# Patient Record
Sex: FEMALE | Race: WHITE | URBAN - NONMETROPOLITAN AREA
[De-identification: names, ages, dates, MRNs, and addresses within clinical notes are randomized per-mention and may not be internally consistent; named-entity substitution may affect disease eponyms.]

---

## 2018-04-04 ENCOUNTER — OFFICE VISIT (OUTPATIENT)
Dept: PODIATRY | Facility: OTHER | Age: 23
End: 2018-04-04
Payer: COMMERCIAL

## 2018-04-04 VITALS
BODY MASS INDEX: 26.63 KG/M2 | HEIGHT: 64 IN | TEMPERATURE: 97.4 F | DIASTOLIC BLOOD PRESSURE: 72 MMHG | SYSTOLIC BLOOD PRESSURE: 120 MMHG | WEIGHT: 156 LBS

## 2018-04-04 DIAGNOSIS — Q66.6 PES VALGUS: ICD-10-CM

## 2018-04-04 DIAGNOSIS — M77.9 CAPSULITIS: Primary | ICD-10-CM

## 2018-04-04 PROCEDURE — 99203 OFFICE O/P NEW LOW 30 MIN: CPT | Performed by: PODIATRIST

## 2018-04-04 ASSESSMENT — PAIN SCALES - GENERAL: PAINLEVEL: MODERATE PAIN (4)

## 2018-04-04 NOTE — LETTER
[facility]  [address] [facility] [address]  [phone]    2018      RE:  Sia Bello  : 1995      To whom it may concern:    This patient may return to work as tolerated in fracture boot for 2-3 weeks.     Sincerely,          Teofilo Burkett DPM

## 2018-04-04 NOTE — NURSING NOTE
Dispensed 1 Pneumatic Walking Brace, Size Small, with [facility] agreement signed by patient. Dipti Fonseca CMA, April 4, 2018

## 2018-04-04 NOTE — NURSING NOTE
"Chief Complaint   Patient presents with     Consult     dorsal Left foot pain,onset 4/1/2018; new pt to [facility]     other     27 weeks pregnant       Initial /72 (BP Location: Right arm, Cuff Size: Adult Regular)  Temp 97.4  F (36.3  C) (Temporal)  Ht 5' 4\" (1.626 m)  Wt 156 lb (70.8 kg)  BMI 26.78 kg/m2 Estimated body mass index is 26.78 kg/(m^2) as calculated from the following:    Height as of this encounter: 5' 4\" (1.626 m).    Weight as of this encounter: 156 lb (70.8 kg).  BP completed using cuff size: regular  Medication Reconciliation: complete    Dipti Fonseca CMA, April 4, 2018    "

## 2018-04-04 NOTE — PATIENT INSTRUCTIONS
Reliable shoe stores: To maximize your experience and provide the best possible fit.  Be sure to show them your foot concerns and tell them Dr. Burkett sent you.      Stores listed in bold have only athletic shoes, and stores that are not bold are mostly casual or variety of shoes    [employer]  [address] [place] [address]  [phone]    [employer] [place]  [address] [place] [address]  [phone]     [employer]  [address] [place] [address]  [phone]    [employer]  [address] [facility] [address]  [phone]    [employer]  [address] [place], [place] [address] [phone]    [employer]  [address] [place] [address]  [phone]                         [employer] Locations:     [address] [place] [address]   [phone]     [address] [place] [address] [address] [place] [address]   [phone]     [address] [place] [address]   [phone]     [address] [facility] [place] [address]   [phone]     [address] [place] [address]   [phone]     [address] [place] [address]   [phone]     [address] [facility] [address]   [place] [address]   [phone]     [address] [facility]. [address]   [place] [address]   [phone]             How to find reasonable shoes          The correct width    Correct Fitting    Correct Length      Foot Distortion    Posture Distortion                          Torsional Rigidity      Grasp behind the heel and underneath the foot and twist      Bad    Excessive torsion/twist in midfoot     Less torsion/twist in midfoot is better                   Heel Counter Rigidity      Grasp just above   midsole and squeeze      Bad    Soft heel counter      Good    Rigid Heel Counter      Flexion Rigidity      Grasp shoe and bend from forefoot to rearfoot

## 2018-04-04 NOTE — LETTER
"    4/4/2018         RE: Sia Bello  [address]  [facility] [address]        Dear Colleague,    Thank you for referring your patient, Sia Bello, to the [facility]. Please see a copy of my visit note below.    HPI:  Sia Bello is a 22 year old female who is seen in consultation at the request of self.    Pt presents for eval of:   (Onset, Location, L/R, Character, Treatments, Injury if yes)    27 weeks pregnant.     Onset 4/1/2018, dorsal Left foot pain that radiates. No injury noted.  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      Weight management plan: Patient was referred to their PCP to discuss a diet and exercise plan.     Patient to follow up with Primary Care provider regarding elevated blood pressure.    ROS:  10 point ROS neg other than the symptoms noted above in the HPI.    PAST MEDICAL HISTORY: History reviewed. No pertinent past medical history.     PAST SURGICAL HISTORY: History reviewed. No pertinent surgical history.     MEDICATIONS:   Current Outpatient Prescriptions:      Prenatal Multivit-Min-Fe-FA (PRENATAL VITAMINS PO), , Disp: , Rfl:      ALLERGIES:  No Known Allergies     SOCIAL HISTORY:   Social History     Social History     Marital status: Single     Spouse name: N/A     Number of children: N/A     Years of education: N/A     Occupational History     Not on file.     Social History Main Topics     Smoking status: Never Smoker     Smokeless tobacco: Never Used     Alcohol use Not on file     Drug use: Not on file     Sexual activity: Not on file     Other Topics Concern     Not on file     Social History Narrative     No narrative on file        FAMILY HISTORY: History reviewed. No pertinent family history.     EXAM:Vitals: /72 (BP Location: Right arm, Cuff Size: Adult Regular)  Temp 97.4  F (36.3  C) (Temporal)  Ht 5' 4\" (1.626 m)  Wt 156 lb (70.8 kg) "  BMI 26.78 kg/m2  BMI= Body mass index is 26.78 kg/(m^2).    General appearance: Patient is alert and fully cooperative with history & exam.  No sign of distress is noted during the visit.     Psychiatric: Affect is pleasant & appropriate.  Patient appears motivated to improve health.     Respiratory: Breathing is regular & unlabored while sitting.     HEENT: Hearing is intact to spoken word.  Speech is clear.  No gross evidence of visual impairment that would impact ambulation.     Vascular: DP & PT pulses are intact & regular bilaterally.  No significant edema or varicosities noted.  CFT and skin temperature is normal to both lower extremities.     Neurologic: Lower extremity sensation is intact to light touch.  No evidence of weakness or contracture in the lower extremities.  No evidence of neuropathy.    Dermatologic: Skin is intact to both lower extremities with adequate texture, turgor and tone about the integument.  No paronychia or evidence of soft tissue infection is noted.     Musculoskeletal: Patient is ambulatory without assistive device or brace.  I will forefoot valgus is noted clinically.  Most of her symptoms appear to be associated about the third and more so the fourth metatarsal shaft of the left foot.  Possible subtle induration is noted no crepitus and negative drawer maneuver of all the metatarsal phalangeal joints.  Subtle discomfort is noted at the fourth metatarsal cuboid joint as well left foot.  Subtle hypermobility at the fourth metatarsal cuboid joint as well.    Radiographs: Patient deferred secondary to being pregnant     ASSESSMENT:       ICD-10-CM    1. Capsulitis M77.9 ORTHOTICS REFERRAL   2. Pes valgus Q66.6 ORTHOTICS REFERRAL        PLAN:  [facility]      4/4/2018   Fracture boot for couple weeks of rest  Letter for weightbearing work as tolerated in the fracture boot  Sleep in the boot is not necessary at this point  Recommended stiff sturdy shoes written  instructions dispensed  Order for custom molded orthotics to provide more support and forefoot protection.  Follow-up in 3 weeks if this remains symptomatic    I suspect her's current slip on  shoes and ugh boots, in addition to mild forefoot valgus, are not providing adequate support for her and she has developed capsulitis overuse of the forefoot.      Teofilo Burkett DPM              Again, thank you for allowing me to participate in the care of your patient.        Sincerely,        Teofilo Burkett DPM

## 2018-04-04 NOTE — MR AVS SNAPSHOT
After Visit Summary   4/4/2018    Sia Bello    MRN: [id number]           Patient Information     Date Of Birth          1995        Visit Information        Provider Department      4/4/2018 9:15 AM Teofilo Burkett DPM [facility]'s Diagnoses     Capsulitis    -  1    Pes valgus          Care Instructions    Reliable shoe stores: To maximize your experience and provide the best possible fit.  Be sure to show them your foot concerns and tell them Dr. Burkett sent you.      Stores listed in bold have only athletic shoes, and stores that are not bold are mostly casual or variety of shoes    [employer]  [address] [place] [address]  [phone]    [employer] [place]  [address] [place] [address]  [phone]    [employer] [facility]  [address] [place] [address]  [phone]    [employer]  [address] [facility] [address]  [phone]    [employer]  [address] [place] [address]  [phone]    [employer]  [address] [place] [address]  [phone]                         [employer] Locations:     [address] [place] [address]   [phone]     [address] [place] [address] [address] [place] [address]   [phone]     [address] [place] [address]   [phone]     [address] [place] [address]   [phone]     [address] [place] [address] [place] [address]   [phone]     [address] [place] [address]   [phone]     [address] [facility] [address]   [place] [address]   [phone]     [address]   [place], [place] [address] [phone]             How to find reasonable shoes          The correct width    Correct Fitting    Correct Length      Foot Distortion    Posture Distortion                          Torsional Rigidity      Grasp behind the heel and underneath the foot and twist      " Bad    Excessive torsion/twist in midfoot     Less torsion/twist in midfoot is better                   Heel Counter Rigidity      Grasp just above   midsole and squeeze      Bad    Soft heel counter      Good    Rigid Heel Counter      Flexion Rigidity      Grasp shoe and bend from forefoot to rearfoot                        Follow-ups after your visit        Additional Services     ORTHOTICS REFERRAL       [facility] scheduling staff may contact patient to arrange appointments for casting of orthotics and often do not leave messages.  The patient may call this number for scheduling at their convenience. Scheduling Phone [phone].      One pair custom functional foot orthotics.   Flexible polypropylene shell with 1/8\" Spenco cushioned top cover, crepe rearfoot post, medial density arch fill, JAE bottom cover.  Aerobic model.                  Who to contact     If you have questions or need follow up information about today's clinic visit or your schedule please contact [facility] directly at [phone].  Normal or non-critical lab and imaging results will be communicated to you by MyChart, letter or phone within 4 business days after the clinic has received the results. If you do not hear from us within 7 days, please contact the clinic through [employer] or phone. If you have a critical or abnormal lab result, we will notify you by phone as soon as possible.  Submit refill requests through [employer] or call your pharmacy and they will forward the refill request to us. Please allow 3 business days for your refill to be completed.          Additional Information About Your Visit        [employer] Information     [employer] lets you send messages to your doctor, view your test results, renew your prescriptions, schedule appointments and more. To sign up, go to [email or web address] . Click on \"Log in\" on the left side of the screen, which will take you to the Welcome page. Then click on \"Sign up Now\" on the " "right side of the page.     You will be asked to enter the access code listed below, as well as some personal information. Please follow the directions to create your username and password.     Your access code is: [email or web address]  Expires: 7/3/2018  9:45 AM     Your access code will  in 90 days. If you need help or a new code, please call your [facility] or [phone].        Care EveryWhere ID     This is your Care EveryWhere ID. This could be used by other organizations to access your [facility] [place] medical records  [id number]        Your Vitals Were     Temperature Height BMI (Body Mass Index)             97.4  F (36.3  C) (Temporal) 5' 4\" (1.626 m) 26.78 kg/m2          Blood Pressure from Last 3 Encounters:   18 120/72    Weight from Last 3 Encounters:   18 156 lb (70.8 kg)              We Performed the Following     ORTHOTICS REFERRAL        Primary Care Provider Office Phone # Fax #    [facility] [phone]       [address] [facility] [address]        Equal Access to Services     JADA [facility] HospitalJAYDE AH: Hadii aad ku hadasho Soomaali, waaxda luqadaha, qaybta kaalmada adeegyada, gianna brizuela . So [facility] [phone].    ATENCIÓN: Si habla español, tiene a grove disposición servicios gratuitos de asistencia lingüística. Llame al [phone].    We comply with applicable federal civil rights laws and [place] laws. We do not discriminate on the basis of race, color, national origin, age, disability, sex, sexual orientation, or gender identity.            Thank you!     Thank you for choosing [facility]  for your care. Our goal is always to provide you with excellent care. Hearing back from our patients is one way we can continue to improve our services. Please take a few minutes to complete the written survey that you may receive in the mail after your visit with us. Thank you!             Your Updated Medication List - Protect others " around you: Learn how to safely use, store and throw away your medicines at www.disposemymeds.org.          This list is accurate as of 4/4/18  9:46 AM.  Always use your most recent med list.                   Brand Name Dispense Instructions for use Diagnosis    PRENATAL VITAMINS PO

## 2021-04-06 NOTE — PROGRESS NOTES
"HPI:  Sia Bello is a 22 year old female who is seen in consultation at the request of self.    Pt presents for eval of:   (Onset, Location, L/R, Character, Treatments, Injury if yes)    27 weeks pregnant.     Onset 4/1/2018, dorsal Left foot pain that radiates. No injury noted.  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      Weight management plan: Patient was referred to their PCP to discuss a diet and exercise plan.     Patient to follow up with Primary Care provider regarding elevated blood pressure.    ROS:  10 point ROS neg other than the symptoms noted above in the HPI.    PAST MEDICAL HISTORY: History reviewed. No pertinent past medical history.     PAST SURGICAL HISTORY: History reviewed. No pertinent surgical history.     MEDICATIONS:   Current Outpatient Prescriptions:      Prenatal Multivit-Min-Fe-FA (PRENATAL VITAMINS PO), , Disp: , Rfl:      ALLERGIES:  No Known Allergies     SOCIAL HISTORY:   Social History     Social History     Marital status: Single     Spouse name: N/A     Number of children: N/A     Years of education: N/A     Occupational History     Not on file.     Social History Main Topics     Smoking status: Never Smoker     Smokeless tobacco: Never Used     Alcohol use Not on file     Drug use: Not on file     Sexual activity: Not on file     Other Topics Concern     Not on file     Social History Narrative     No narrative on file        FAMILY HISTORY: History reviewed. No pertinent family history.     EXAM:Vitals: /72 (BP Location: Right arm, Cuff Size: Adult Regular)  Temp 97.4  F (36.3  C) (Temporal)  Ht 5' 4\" (1.626 m)  Wt 156 lb (70.8 kg)  BMI 26.78 kg/m2  BMI= Body mass index is 26.78 kg/(m^2).    General appearance: Patient is alert and fully cooperative with history & exam.  No sign of distress is noted during the visit.     Psychiatric: Affect is pleasant & " -- DO NOT REPLY / DO NOT REPLY ALL --  -- Message is from the [facility]--    COVID-19 Universal Screening: N/A - Not about scheduling    General Patient Message      Reason for Call: Patient is calling in stating that received a call from Gladys to call back into office for appointment, please call patient back to assist.     Caller Information       Type Contact Phone    04/06/2021 03:50 PM CDT Phone (Incoming) Justo Nair (Self) [phone] (M)          Alternative phone number: n.a    Turnaround time given to caller:   \"This message will be sent to [state Provider's name]. The clinical team will fulfill your request as soon as they review your message.\"     appropriate.  Patient appears motivated to improve health.     Respiratory: Breathing is regular & unlabored while sitting.     HEENT: Hearing is intact to spoken word.  Speech is clear.  No gross evidence of visual impairment that would impact ambulation.     Vascular: DP & PT pulses are intact & regular bilaterally.  No significant edema or varicosities noted.  CFT and skin temperature is normal to both lower extremities.     Neurologic: Lower extremity sensation is intact to light touch.  No evidence of weakness or contracture in the lower extremities.  No evidence of neuropathy.    Dermatologic: Skin is intact to both lower extremities with adequate texture, turgor and tone about the integument.  No paronychia or evidence of soft tissue infection is noted.     Musculoskeletal: Patient is ambulatory without assistive device or brace.  I will forefoot valgus is noted clinically.  Most of her symptoms appear to be associated about the third and more so the fourth metatarsal shaft of the left foot.  Possible subtle induration is noted no crepitus and negative drawer maneuver of all the metatarsal phalangeal joints.  Subtle discomfort is noted at the fourth metatarsal cuboid joint as well left foot.  Subtle hypermobility at the fourth metatarsal cuboid joint as well.    Radiographs: Patient deferred secondary to being pregnant     ASSESSMENT:       ICD-10-CM    1. Capsulitis M77.9 ORTHOTICS REFERRAL   2. Pes valgus Q66.6 ORTHOTICS REFERRAL        PLAN:  Reviewed patient's chart in [facility].      4/4/2018   Fracture boot for couple weeks of rest  Letter for weightbearing work as tolerated in the fracture boot  Sleep in the boot is not necessary at this point  Recommended stiff sturdy shoes written instructions dispensed  Order for custom molded orthotics to provide more support and forefoot protection.  Follow-up in 3 weeks if this remains symptomatic    I suspect her's current slip on  shoes and ugh boots, in  addition to mild forefoot valgus, are not providing adequate support for her and she has developed capsulitis overuse of the forefoot.      Teofilo Burkett DPM